# Patient Record
Sex: FEMALE | Race: WHITE | NOT HISPANIC OR LATINO | URBAN - METROPOLITAN AREA
[De-identification: names, ages, dates, MRNs, and addresses within clinical notes are randomized per-mention and may not be internally consistent; named-entity substitution may affect disease eponyms.]

---

## 2023-09-12 PROBLEM — F41.9 ANXIETY DISORDER: Status: ACTIVE | Noted: 2023-09-12

## 2023-09-12 PROBLEM — C50.919 INVASIVE DUCTAL CARCINOMA OF BREAST (MULTI): Status: ACTIVE | Noted: 2023-09-12

## 2023-09-12 RX ORDER — AMITRIPTYLINE HYDROCHLORIDE 10 MG/1
TABLET, FILM COATED ORAL
COMMUNITY
Start: 2023-01-14 | End: 2023-11-20 | Stop reason: WASHOUT

## 2023-09-12 RX ORDER — ACETAMINOPHEN 325 MG/1
1-2 TABLET ORAL DAILY PRN
COMMUNITY
Start: 2022-05-20

## 2023-09-12 RX ORDER — ATORVASTATIN CALCIUM 10 MG/1
TABLET, FILM COATED ORAL
COMMUNITY
Start: 2020-01-07 | End: 2023-11-20 | Stop reason: WASHOUT

## 2023-09-12 RX ORDER — FLUOXETINE HYDROCHLORIDE 20 MG/1
2 CAPSULE ORAL DAILY
COMMUNITY
Start: 2018-10-24 | End: 2023-11-20 | Stop reason: SDUPTHER

## 2023-09-12 RX ORDER — CHOLECALCIFEROL (VITAMIN D3) 125 MCG
1 CAPSULE ORAL DAILY
COMMUNITY
Start: 2020-10-01

## 2023-10-16 ENCOUNTER — APPOINTMENT (OUTPATIENT)
Dept: BEHAVIORAL HEALTH | Facility: CLINIC | Age: 58
End: 2023-10-16
Payer: COMMERCIAL

## 2023-10-16 ENCOUNTER — TELEPHONE (OUTPATIENT)
Dept: OTHER | Age: 58
End: 2023-10-16

## 2023-10-16 NOTE — TELEPHONE ENCOUNTER
Caller : pt, had appointment with you today, but provider cxl    Medication:  Fluoxetine 20 mg    Pharmacy:  [facility]    Next visit: 11/20

## 2023-11-13 PROBLEM — M25.641 FINGER STIFFNESS, RIGHT: Status: ACTIVE | Noted: 2019-01-30

## 2023-11-13 PROBLEM — K21.9 GERD (GASTROESOPHAGEAL REFLUX DISEASE): Status: ACTIVE | Noted: 2023-01-10

## 2023-11-13 PROBLEM — K64.4 EXTERNAL HEMORRHOIDS: Status: ACTIVE | Noted: 2018-11-05

## 2023-11-13 PROBLEM — F41.9 ANXIETY: Status: ACTIVE | Noted: 2023-01-10

## 2023-11-13 PROBLEM — R31.29 MICROSCOPIC HEMATURIA: Status: ACTIVE | Noted: 2018-01-09

## 2023-11-13 PROBLEM — R31.9 HEMATURIA: Status: ACTIVE | Noted: 2018-09-06

## 2023-11-13 PROBLEM — Z98.890 PONV (POSTOPERATIVE NAUSEA AND VOMITING): Status: ACTIVE | Noted: 2023-01-10

## 2023-11-13 PROBLEM — K62.5 RB (RECTAL BLEEDING): Status: ACTIVE | Noted: 2018-11-05

## 2023-11-13 PROBLEM — F52.0 HYPOACTIVE SEXUAL DESIRE DISORDER: Status: ACTIVE | Noted: 2023-11-13

## 2023-11-13 PROBLEM — E78.5 HYPERLIPIDEMIA: Status: ACTIVE | Noted: 2023-01-10

## 2023-11-13 PROBLEM — M17.12 PRIMARY OSTEOARTHRITIS OF LEFT KNEE: Status: ACTIVE | Noted: 2020-01-15

## 2023-11-13 PROBLEM — R23.4 BREAST SKIN CHANGES: Status: ACTIVE | Noted: 2022-02-11

## 2023-11-13 PROBLEM — N95.2 VAGINAL ATROPHY: Status: ACTIVE | Noted: 2020-10-01

## 2023-11-13 PROBLEM — C44.310 FACIAL BASAL CELL CANCER: Status: ACTIVE | Noted: 2023-11-13

## 2023-11-13 PROBLEM — S52.531A FRACTURE, COLLES, RIGHT, CLOSED: Status: ACTIVE | Noted: 2019-01-23

## 2023-11-13 PROBLEM — C44.329 SQUAMOUS CELL CANCER OF SKIN OF RIGHT CHEEK: Status: ACTIVE | Noted: 2023-01-10

## 2023-11-13 PROBLEM — C44.612 BASAL CELL CARCINOMA (BCC) OF RIGHT UPPER ARM: Status: ACTIVE | Noted: 2023-01-10

## 2023-11-13 PROBLEM — M25.631 WRIST STIFFNESS, RIGHT: Status: ACTIVE | Noted: 2019-01-30

## 2023-11-13 PROBLEM — C50.919 INFILTRATING DUCTAL CARCINOMA OF BREAST (MULTI): Status: ACTIVE | Noted: 2021-11-01

## 2023-11-13 PROBLEM — E83.52 HYPERCALCEMIA: Status: ACTIVE | Noted: 2023-11-13

## 2023-11-13 PROBLEM — N95.1 SYMPTOMATIC MENOPAUSAL OR FEMALE CLIMACTERIC STATES: Status: ACTIVE | Noted: 2020-10-01

## 2023-11-13 PROBLEM — M25.50 JOINT PAIN: Status: ACTIVE | Noted: 2023-11-13

## 2023-11-13 PROBLEM — R11.2 PONV (POSTOPERATIVE NAUSEA AND VOMITING): Status: ACTIVE | Noted: 2023-01-10

## 2023-11-13 PROBLEM — D25.9 FIBROID UTERUS: Status: ACTIVE | Noted: 2023-11-13

## 2023-11-13 PROBLEM — M81.0 OSTEOPOROSIS: Status: ACTIVE | Noted: 2019-01-01

## 2023-11-13 PROBLEM — E55.9 VITAMIN D DEFICIENCY: Status: ACTIVE | Noted: 2019-01-23

## 2023-11-13 RX ORDER — OMEPRAZOLE 20 MG/1
20 CAPSULE, DELAYED RELEASE ORAL
COMMUNITY
Start: 2022-10-26 | End: 2023-11-20 | Stop reason: WASHOUT

## 2023-11-13 RX ORDER — TRIAMCINOLONE ACETONIDE 1 MG/G
OINTMENT TOPICAL
COMMUNITY
Start: 2023-04-07 | End: 2023-11-20 | Stop reason: WASHOUT

## 2023-11-13 RX ORDER — NORETHINDRONE ACETATE AND ETHINYL ESTRADIOL, ETHINYL ESTRADIOL AND FERROUS FUMARATE 1MG-10(24)
KIT ORAL
COMMUNITY
End: 2023-11-20 | Stop reason: WASHOUT

## 2023-11-13 RX ORDER — ATORVASTATIN CALCIUM 20 MG/1
TABLET, FILM COATED ORAL
COMMUNITY
Start: 2023-10-11 | End: 2023-11-20 | Stop reason: WASHOUT

## 2023-11-13 RX ORDER — NITROFURANTOIN 25; 75 MG/1; MG/1
100 CAPSULE ORAL 2 TIMES DAILY
COMMUNITY
Start: 2023-08-17 | End: 2023-08-22

## 2023-11-13 RX ORDER — SULFAMETHOXAZOLE AND TRIMETHOPRIM 800; 160 MG/1; MG/1
1 TABLET ORAL 2 TIMES DAILY
COMMUNITY
Start: 2023-09-08 | End: 2023-09-11

## 2023-11-20 ENCOUNTER — TELEMEDICINE (OUTPATIENT)
Dept: BEHAVIORAL HEALTH | Facility: CLINIC | Age: 58
End: 2023-11-20
Payer: COMMERCIAL

## 2023-11-20 DIAGNOSIS — F41.9 ANXIETY: ICD-10-CM

## 2023-11-20 PROCEDURE — 99213 OFFICE O/P EST LOW 20 MIN: CPT | Performed by: CLINICAL NURSE SPECIALIST

## 2023-11-20 RX ORDER — FLUOXETINE HYDROCHLORIDE 20 MG/1
40 CAPSULE ORAL DAILY
Qty: 180 CAPSULE | Refills: 2 | Status: SHIPPED | OUTPATIENT
Start: 2023-11-20 | End: 2023-12-29 | Stop reason: SDUPTHER

## 2023-11-20 RX ORDER — ATORVASTATIN CALCIUM 40 MG/1
40 TABLET, FILM COATED ORAL DAILY
COMMUNITY
Start: 2023-11-02

## 2023-11-20 NOTE — PROGRESS NOTES
Outpatient Psychiatry      Subjective   Heidi Meyer, a 58 y.o. female,presents as an established patient appointment for medication management /outpatient psychiatry     Diagnosis:    Anxiety disorder unspecified type     Patient Active Problem List   Diagnosis    Anxiety disorder    Invasive ductal carcinoma of breast (CMS/HCC)    Basal cell carcinoma (BCC) of right upper arm    Facial basal cell cancer    Blepharochalasis    Breast skin changes    Chondromalacia of patella    Primary osteoarthritis of left knee    Diarrhea    External hemorrhoids    Fatigue    Fibroid uterus    Finger stiffness, right    Fracture, Colles, right, closed    GERD (gastroesophageal reflux disease)    Hematuria    Hypercalcemia    Hypoactive sexual desire disorder    Joint pain    Localized osteoarthrosis    Microscopic hematuria    Hyperlipidemia    Moderate mixed hyperlipidemia not requiring statin therapy    Osteoporosis    PMDD (premenstrual dysphoric disorder)    PONV (postoperative nausea and vomiting)    RB (rectal bleeding)    Sprain of foot    Squamous cell cancer of skin of right cheek    Symptomatic menopausal or female climacteric states    Uterus, adenomyosis    Vaginal atrophy    Vitamin D deficiency    Wrist stiffness, right    Anxiety    Infiltrating ductal carcinoma of breast (CMS/HCC)       Treatment Goals:  Specify outcomes written in observable, behavioral terms:   Anxiety: reducing physical symptoms of anxiety    Treatment Plan/Recommendations: to follow up in 3-4 months , can call  for treatment and scheduling concerns , to continue self care and wellness efforts   Follow-up plan for depression was discussed with patient.    Review with patient: Treatment plan reviewed with the patient.  Medication risks/benefit reviewed with the patient     HPI:  mood has been stable   no concerns with fluoxetine , says this is working well for her for anxiety and depression treatment   support provided , she is  able to identify stressors and is coping well with them  reconciled medication list in the [facility] emr   sees oncology for breast cancer history          Review of Systems     Depressive Symptoms: fatigue, but~not depressed or irritable,~no loss of interest,~no change in appetite,~no recent 6 pounds lb weight gain,~no recent lb weight loss,~no insomnia,~not feeling worthless or guilty,~normal concentration,~ability to make decisions,~no suicidal ideation,~no guns or weapons in household.   Manic Symptoms: mood is not irritable or elevated,~self esteem is not grandiose or increased,~no changes in need for sleep,~not more talkative than usual,~does not have flight of ideas or racing thoughts,~no distractibility,~no psychomotor agitation or increased goal-directed activity,~no excessive involvement in pleasurable activities.   Psychotic Symptoms: no hallucinations,~no delusions,~no disorganized speech,~does not have disorganized behavior or catatonia,~no negative symptoms.   Anxiety Symptoms: no panic attacks,~no concerns about future panic attacks,~no worry about panic attack consequences,~no change in behavior due to panic attacks,~no excessive worry,~no difficulty controlling worry,~no increase in arousal,~not easily fatigued due to worry,~no difficulty concentrating due to worry,~no irritability due to worry,~no muscle tension due to worry,~no sleep disturbances due to worry,~no specific phobia,~no social phobia,~no obsessions,~no compulsions,~no exposure to traumatic event,~no re-experiencing of traumatic event,~no avoidance of stimuli and number of responsiveness,~no restlessness / feeling on edge due to worry.   Delirium/ Altered Mental Status Symptoms: no disturbances of consciousness,~no diminished ability to focus, sustain, shift attention,~no change in cognition or perceptual disturbances,~symptoms do not fluctuate during the course of the day,~general medical condition is not present.   Disordered Eating  Symptoms: weight is not less than 85% of ideal body weight,~no intense fear of gaining weight,~does not have a poor body image,~no restricting of diet and/or excessive exercise,~no purging or laxative use.   Post-traumatic stress disorder symptoms The patient is currently asymptomatic.   Inattentive Symptoms: does not make careless mistakes often,~does not have difficulty paying attention,~not often disorganized,~does not lose things often,~is not easily distracted,~is not often forgetful,~does not avoid/dislike tasks with sustained mental effort,~listens when spoken to directly,~is able to follow instructions and finish schoolwork.   Conduct Issues: no aggression towards people or animals,~no destruction of property,~no deceitfulness,~does not violate rules.   Other Symptoms/ Concerns: no symptoms of separation anxiety,~no reactive attachment symptoms,~no motor tics,~no vocal tics,~no stuttering,~no phonological problems,~no loss of urine control,~no encopresis,~no intellectual disability,~no self-injurious behaviors,~not somatic and no conversion symptoms,~no gender identity symptoms,~no sleep disorder symptoms,~no impulse control symptoms,~no personality disorder symptoms.       Constitutional: no sleep apnea,~normal sleeping,~no night wakings,~no snoring,~not a picky eater,~normal appetite,~no swallowing problems,~no night terrors,~no nightmares,~no restless sleep,~no snorts/gasps~and~no obesity.   Eyes: no vision test,~no vision impairment,~does not wear glasses/contacts,~does not wear glassess/contacts~and~no blindness.   ENT: no hearing tested,~no hearing loss,~no hearing aid,~no cochlear implant,~no excessive drooling,~no dental problems~and~no recurrent strep throat.   Cardiovascular: no murmur,~no heart defect,~no chest pain,~no palpitations~and~no syncope.   Respiratory: no wheezing~and~no asthma/reactive airway disease.   Gastrointestinal: no constipation,~no abdominal pain,~no nausea,~no vomiting,~no  diarrhea,~no blood in stools,~no g-tube~and~no reflux.   Genitourinary: no nocturnal enuresis,~no diurnal enuresis~and~no incontinence.   Musculoskeletal: normal gait~and~no torticollis, but~moving all extremities well and symmetrical,~no arthritis or joint problems,~no myalgias,~no muscle weakness~and~normal hand preference.   Integumentary: no changes in moles or birthmarks,~no rashes~and~no atopic dermatitis.   Neurological: no symmetrical facies,~no headache,~no head injury,~no seizures,~no staring spells,~no loss of consciousness,~no meningitis/encephalitis,~no cerebral palsy,~no spina bifida,~no stereotypy,~no developmental regression~and~no tics or twitches.   Endocrine: no temperature intolerance,~,~good growth~and~no failure to thrive.   Hematologic/Lymphatic: no anemia~and~no lead poisoning.        Current Medications:    Current Outpatient Medications:     atorvastatin (Lipitor) 20 mg tablet, , Disp: , Rfl:     omeprazole (PriLOSEC) 20 mg DR capsule, Take 1 capsule (20 mg) by mouth once daily., Disp: , Rfl:     triamcinolone (Kenalog) 0.1 % ointment, Apply to red irritated areas, twice a day as needed, Disp: , Rfl:     acetaminophen (TylenoL) 325 mg tablet, Take 1-2 tablets (325-650 mg) by mouth once daily as needed., Disp: , Rfl:     amitriptyline (Elavil) 10 mg tablet, , Disp: , Rfl:     amitriptyline HCl (AMITRIPTYLINE ORAL), , Disp: , Rfl:     atorvastatin (Lipitor) 10 mg tablet, Take by mouth., Disp: , Rfl:     cholecalciferol (Vitamin D-3) 125 MCG (5000 UT) capsule, Take 1 capsule (125 mcg) by mouth once daily., Disp: , Rfl:     FLUoxetine (PROzac) 20 mg capsule, Take 2 capsules (40 mg) by mouth once daily., Disp: , Rfl:     norethindrone-e.estradioL-iron (Lo Loestrin Fe) 1 mg-10 mcg (24)/10 mcg (2) tablet, , Disp: , Rfl:   Medical History:  No past medical history on file.  Surgical History:  No past surgical history on file.  Family History:  No family history on file.  Social History:  Social  History     Socioeconomic History    Marital status:      Spouse name: Not on file    Number of children: Not on file    Years of education: Not on file    Highest education level: Not on file   Occupational History    Not on file   Tobacco Use    Smoking status: Not on file    Smokeless tobacco: Not on file   Substance and Sexual Activity    Alcohol use: Not on file    Drug use: Not on file    Sexual activity: Not on file   Other Topics Concern    Not on file   Social History Narrative    Not on file     Social Determinants of Health     Financial Resource Strain: Not on file   Food Insecurity: Not on file   Transportation Needs: Not on file   Physical Activity: Not on file   Stress: Not on file   Social Connections: Not on file   Intimate Partner Violence: Not on file   Housing Stability: Not on file     Record Review: brief     Vitals:  There were no vitals filed for this visit.    Johana Woods, APRN-CNS

## 2023-12-29 ENCOUNTER — TELEMEDICINE (OUTPATIENT)
Dept: BEHAVIORAL HEALTH | Facility: CLINIC | Age: 58
End: 2023-12-29
Payer: COMMERCIAL

## 2023-12-29 DIAGNOSIS — F41.9 ANXIETY: ICD-10-CM

## 2023-12-29 PROCEDURE — 99215 OFFICE O/P EST HI 40 MIN: CPT | Performed by: CLINICAL NURSE SPECIALIST

## 2023-12-29 RX ORDER — FLUOXETINE HYDROCHLORIDE 20 MG/1
40 CAPSULE ORAL DAILY
Qty: 180 CAPSULE | Refills: 2 | Status: SHIPPED | OUTPATIENT
Start: 2023-12-29 | End: 2024-03-28
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
## 2024-09-03 ENCOUNTER — TELEPHONE (OUTPATIENT)
Dept: BEHAVIORAL HEALTH | Facility: CLINIC | Age: 59
End: 2024-09-03
Payer: COMMERCIAL

## 2024-09-03 DIAGNOSIS — F41.9 ANXIETY: ICD-10-CM

## 2024-09-03 RX ORDER — FLUOXETINE HYDROCHLORIDE 20 MG/1
40 CAPSULE ORAL DAILY
Qty: 180 CAPSULE | Refills: 2 | Status: CANCELLED | OUTPATIENT
Start: 2024-09-03 | End: 2024-12-02

## 2024-09-04 ENCOUNTER — DOCUMENTATION (OUTPATIENT)
Dept: BEHAVIORAL HEALTH | Facility: CLINIC | Age: 59
End: 2024-09-04
Payer: COMMERCIAL

## 2024-09-04 DIAGNOSIS — F41.9 ANXIETY: ICD-10-CM

## 2024-09-04 DIAGNOSIS — F41.1 GAD (GENERALIZED ANXIETY DISORDER): ICD-10-CM

## 2024-09-04 RX ORDER — FLUOXETINE HYDROCHLORIDE 20 MG/1
40 CAPSULE ORAL DAILY
Qty: 180 CAPSULE | Refills: 0 | Status: SHIPPED | OUTPATIENT
Start: 2024-09-04 | End: 2024-12-03

## 2024-09-04 NOTE — PROGRESS NOTES
Non billable note : sent refill per request to pharmacy after review of [facility] emr med order history for fluoxetine kpacer cns

## 2024-10-11 ENCOUNTER — APPOINTMENT (OUTPATIENT)
Dept: BEHAVIORAL HEALTH | Facility: CLINIC | Age: 59
End: 2024-10-11
Payer: COMMERCIAL

## 2024-10-11 DIAGNOSIS — Z91.49 PSYCHOLOGICAL TRAUMA HISTORY: ICD-10-CM

## 2024-10-11 DIAGNOSIS — F41.9 ANXIETY: ICD-10-CM

## 2024-10-11 PROCEDURE — 99213 OFFICE O/P EST LOW 20 MIN: CPT | Performed by: CLINICAL NURSE SPECIALIST

## 2024-10-11 RX ORDER — FLUOXETINE HYDROCHLORIDE 20 MG/1
40 CAPSULE ORAL DAILY
Qty: 180 CAPSULE | Refills: 2 | Status: SHIPPED | OUTPATIENT
Start: 2024-10-11 | End: 2025-01-09

## 2024-10-11 NOTE — PROGRESS NOTES
Outpatient Psychiatry      Subjective   Heidi Meyer, a 59 y.o. female, presents as an established patient appointment for medication management /outpatient psychiatry virtual audio and video appointment   Virtual or Telephone Consent    An interactive audio and video telecommunication system which permits real time communications between the patient (at the originating site) and provider (at the distant site) was utilized to provide this telehealth service.   Verbal consent was requested and obtained from Heidi Meyer on this date, 10/20/24 for a telehealth visit.       Diagnosis: Anxiety disorder unspecified type F41.9 stable   Trauma history Z91.49      Problem List       Patient Active Problem List   Diagnosis    Anxiety disorder    Invasive ductal carcinoma of breast (CMS/HCC)    Basal cell carcinoma (BCC) of right upper arm    Facial basal cell cancer    Blepharochalasis    Breast skin changes    Chondromalacia of patella    Primary osteoarthritis of left knee    Diarrhea    External hemorrhoids    Fatigue    Fibroid uterus    Finger stiffness, right    Fracture, Colles, right, closed    GERD (gastroesophageal reflux disease)    Hematuria    Hypercalcemia    Hypoactive sexual desire disorder    Joint pain    Localized osteoarthrosis    Microscopic hematuria    Hyperlipidemia    Moderate mixed hyperlipidemia not requiring statin therapy    Osteoporosis    PMDD (premenstrual dysphoric disorder)    PONV (postoperative nausea and vomiting)    RB (rectal bleeding)    Sprain of foot    Squamous cell cancer of skin of right cheek    Symptomatic menopausal or female climacteric states    Uterus, adenomyosis    Vaginal atrophy    Vitamin D deficiency    Wrist stiffness, right    Anxiety    Infiltrating ductal carcinoma of breast (CMS/HCC)         Treatment Goals:  Specify outcomes written in observable, behavioral terms:   Anxiety: reducing physical symptoms of anxiety      Treatment Plan/Recommendations: to  follow up in 3-4 months , can call  for treatment and scheduling concerns , to continue self care and wellness efforts    Follow-up plan was discussed with patient.  Psychotropic medication :  Continue fluoxetine 20 mg , 2 capsules daily for anxiety      Review with patient: Treatment plan reviewed with the patient.  Medication risks/benefit reviewed with the patient     HPI:   no concerns with fluoxetine , says this is working well for her for anxiety and depression treatment   support provided , she is able to identify stressors and is coping well with them, support provided , insight is good   She says she has been more tired lately    reconciled medication list in the [facility] emr       sees oncology for breast cancer history   Is having some ongoing medical care per gyn provider   reviewed notes in the [facility] emr from other medical providers       psych ros and medical ros as noted above     Patient Active Problem List   Diagnosis    Anxiety disorder    Invasive ductal carcinoma of breast (Multi)    Basal cell carcinoma (BCC) of right upper arm    Facial basal cell cancer    Blepharochalasis    Breast skin changes    Chondromalacia of patella    Primary osteoarthritis of left knee    Diarrhea    External hemorrhoids    Fatigue    Fibroid uterus    Finger stiffness, right    Fracture, Colles, right, closed    GERD (gastroesophageal reflux disease)    Hematuria    Hypercalcemia    Hypoactive sexual desire disorder    Joint pain    Localized osteoarthrosis    Microscopic hematuria    Hyperlipidemia    Moderate mixed hyperlipidemia not requiring statin therapy    Osteoporosis    PMDD (premenstrual dysphoric disorder)    PONV (postoperative nausea and vomiting)    RB (rectal bleeding)    Sprain of foot    Squamous cell cancer of skin of right cheek    Symptomatic menopausal or female climacteric states    Uterus, adenomyosis    Vaginal atrophy    Vitamin D deficiency    Wrist stiffness, right    Anxiety     Infiltrating ductal carcinoma of breast (Multi)     Current Outpatient Medications:     acetaminophen (TylenoL) 325 mg tablet, Take 1-2 tablets (325-650 mg) by mouth once daily as needed., Disp: , Rfl:     atorvastatin (Lipitor) 40 mg tablet, Take 1 tablet (40 mg) by mouth once daily., Disp: , Rfl:     cholecalciferol (Vitamin D-3) 125 MCG (5000 UT) capsule, Take 1 capsule (125 mcg) by mouth once daily., Disp: , Rfl:     FLUoxetine (PROzac) 20 mg capsule, Take 2 capsules (40 mg) by mouth once daily., Disp: 180 capsule, Rfl: 0  Medical History:  No past medical history on file.  Surgical History:  No past surgical history on file.  Family History:  No family history on file.  Social History:  Social History     Socioeconomic History    Marital status:      Spouse name: Not on file    Number of children: Not on file    Years of education: Not on file    Highest education level: Not on file   Occupational History    Not on file   Tobacco Use    Smoking status: Not on file    Smokeless tobacco: Not on file   Substance and Sexual Activity    Alcohol use: Not on file    Drug use: Not on file    Sexual activity: Not on file   Other Topics Concern    Not on file   Social History Narrative    Not on file     Social Determinants of Health     Financial Resource Strain: Low Risk  (10/1/2023)    Received from [facility]    Overall Financial Resource Strain (CARDIA)     Difficulty of Paying Living Expenses: Not hard at all   Food Insecurity: No Food Insecurity (10/1/2023)    Received from [facility]    Hunger Vital Sign     Worried About Running Out of Food in the Last Year: Never true     Ran Out of Food in the Last Year: Never true   Transportation Needs: No Transportation Needs (10/1/2023)    Received from [facility]    PRAPARE - Transportation     Lack of Transportation (Medical): No     Lack of Transportation (Non-Medical): No   Physical  Activity: Sufficiently Active (10/1/2023)    Received from [facility]    Exercise Vital Sign     Days of Exercise per Week: 4 days     Minutes of Exercise per Session: 40 min   Stress: No Stress Concern Present (10/1/2023)    Received from [facility]    [employer] [place] [employer] - Occupational Stress Questionnaire     Feeling of Stress : Not at all   Social Connections: Socially Integrated (10/1/2023)    Received from [facility]    Social Connection and Isolation Panel [NHANES]     Frequency of Communication with Friends and Family: More than three times a week     Frequency of Social Gatherings with Friends and Family: Three times a week     Attends Mormonism Services: 1 to 4 times per year     Active Member of Clubs or Organizations: Yes     Attends Club or Organization Meetings: 1 to 4 times per year     Marital Status:    Intimate Partner Violence: Not on file   Housing Stability: Low Risk  (10/1/2023)    Received from [facility]    Housing Stability Vital Sign     Unable to Pay for Housing in the Last Year: No     Number of Places Lived in the Last Year: 1     Unstable Housing in the Last Year: No     Vitals:  There were no vitals filed for this visit.    Johana Woods, APRN-CNS

## 2024-12-05 ENCOUNTER — DOCUMENTATION (OUTPATIENT)
Dept: BEHAVIORAL HEALTH | Facility: CLINIC | Age: 59
End: 2024-12-05
Payer: COMMERCIAL

## 2024-12-05 DIAGNOSIS — F41.9 ANXIETY: ICD-10-CM

## 2024-12-05 RX ORDER — FLUOXETINE HYDROCHLORIDE 40 MG/1
40 CAPSULE ORAL DAILY
Qty: 90 CAPSULE | Refills: 1 | Status: SHIPPED | OUTPATIENT
Start: 2024-12-05 | End: 2025-03-05

## 2024-12-05 RX ORDER — FLUOXETINE HYDROCHLORIDE 20 MG/1
20 CAPSULE ORAL DAILY
Qty: 90 CAPSULE | Refills: 1 | Status: SHIPPED | OUTPATIENT
Start: 2024-12-05 | End: 2025-03-05

## 2024-12-05 NOTE — PROGRESS NOTES
Nonbillable note : patient sent message about desire to increase dose for increased anxiety , communicated with patient and sent increased dose to pharmacy after review of [facility] emr med order history kpacer cns

## 2025-03-07 ENCOUNTER — APPOINTMENT (OUTPATIENT)
Dept: BEHAVIORAL HEALTH | Facility: CLINIC | Age: 60
End: 2025-03-07
Payer: COMMERCIAL

## 2025-03-12 ENCOUNTER — APPOINTMENT (OUTPATIENT)
Dept: BEHAVIORAL HEALTH | Facility: CLINIC | Age: 60
End: 2025-03-12
Payer: COMMERCIAL

## 2025-04-13 ENCOUNTER — DOCUMENTATION (OUTPATIENT)
Dept: BEHAVIORAL HEALTH | Facility: CLINIC | Age: 60
End: 2025-04-13
Payer: COMMERCIAL

## 2025-04-13 DIAGNOSIS — F41.9 ANXIETY DISORDER, UNSPECIFIED TYPE: ICD-10-CM

## 2025-04-13 DIAGNOSIS — F41.9 ANXIETY: ICD-10-CM

## 2025-04-13 RX ORDER — FLUOXETINE HYDROCHLORIDE 40 MG/1
40 CAPSULE ORAL DAILY
Qty: 90 CAPSULE | Refills: 0 | Status: SHIPPED | OUTPATIENT
Start: 2025-04-13 | End: 2025-04-14 | Stop reason: SDUPTHER

## 2025-04-13 RX ORDER — FLUOXETINE HYDROCHLORIDE 20 MG/1
20 CAPSULE ORAL DAILY
Qty: 90 CAPSULE | Refills: 0 | Status: SHIPPED | OUTPATIENT
Start: 2025-04-13 | End: 2025-04-14 | Stop reason: SDUPTHER

## 2025-04-13 NOTE — PROGRESS NOTES
Nonbillable note : patient left message about need for fluoxetine script refills . Reviewedmost recent appointment note in the [facility] emr , reviewed med history in the [facility] em[facility] and sent scripts to pharmacy [facility]

## 2025-04-14 ENCOUNTER — DOCUMENTATION (OUTPATIENT)
Dept: BEHAVIORAL HEALTH | Facility: CLINIC | Age: 60
End: 2025-04-14
Payer: COMMERCIAL

## 2025-04-14 DIAGNOSIS — F41.9 ANXIETY: ICD-10-CM

## 2025-04-14 RX ORDER — FLUOXETINE HYDROCHLORIDE 20 MG/1
20 CAPSULE ORAL DAILY
Qty: 90 CAPSULE | Refills: 0 | Status: SHIPPED | OUTPATIENT
Start: 2025-04-14 | End: 2025-07-13

## 2025-04-14 RX ORDER — FLUOXETINE HYDROCHLORIDE 40 MG/1
40 CAPSULE ORAL DAILY
Qty: 90 CAPSULE | Refills: 0 | Status: SHIPPED | OUTPATIENT
Start: 2025-04-14 | End: 2025-07-13

## 2025-04-29 ENCOUNTER — APPOINTMENT (OUTPATIENT)
Dept: BEHAVIORAL HEALTH | Facility: CLINIC | Age: 60
End: 2025-04-29
Payer: COMMERCIAL

## 2025-04-29 DIAGNOSIS — F41.9 ANXIETY: ICD-10-CM

## 2025-04-29 PROCEDURE — 99212 OFFICE O/P EST SF 10 MIN: CPT | Performed by: CLINICAL NURSE SPECIALIST

## 2025-04-29 RX ORDER — ATORVASTATIN CALCIUM 80 MG/1
80 TABLET, FILM COATED ORAL DAILY
COMMUNITY
Start: 2025-04-20

## 2025-04-29 RX ORDER — FLUOXETINE HYDROCHLORIDE 40 MG/1
40 CAPSULE ORAL DAILY
Qty: 90 CAPSULE | Refills: 1 | Status: SHIPPED | OUTPATIENT
Start: 2025-04-29 | End: 2025-07-28

## 2025-04-29 RX ORDER — FLUOXETINE 10 MG/1
10 CAPSULE ORAL DAILY
Qty: 90 CAPSULE | Refills: 1 | Status: SHIPPED | OUTPATIENT
Start: 2025-04-29 | End: 2025-07-28

## 2025-04-29 NOTE — PROGRESS NOTES
Outpatient Psychiatry      Subjective   Heidi Meyer, a 59 y.o. female, presents as an established patient appointment for medication management /outpatient psychiatry virtual audio and video appointment   Virtual or Telephone Consent     An interactive audio and video telecommunication system which permits real time communications between the patient (at the originating site) and provider (at the distant site) was utilized to provide this telehealth service.   Verbal consent was requested and obtained from Heidi Meyer on this date, 4/29/25 for a telehealth visit.       Diagnosis: Anxiety disorder unspecified type F41.9 stable   Trauma history Z91.49      Problem List         Patient Active Problem List   Diagnosis    Anxiety disorder    Invasive ductal carcinoma of breast (CMS/HCC)    Basal cell carcinoma (BCC) of right upper arm    Facial basal cell cancer    Blepharochalasis    Breast skin changes    Chondromalacia of patella    Primary osteoarthritis of left knee    Diarrhea    External hemorrhoids    Fatigue    Fibroid uterus    Finger stiffness, right    Fracture, Colles, right, closed    GERD (gastroesophageal reflux disease)    Hematuria    Hypercalcemia    Hypoactive sexual desire disorder    Joint pain    Localized osteoarthrosis    Microscopic hematuria    Hyperlipidemia    Moderate mixed hyperlipidemia not requiring statin therapy    Osteoporosis    PMDD (premenstrual dysphoric disorder)    PONV (postoperative nausea and vomiting)    RB (rectal bleeding)    Sprain of foot    Squamous cell cancer of skin of right cheek    Symptomatic menopausal or female climacteric states    Uterus, adenomyosis    Vaginal atrophy    Vitamin D deficiency    Wrist stiffness, right    Anxiety    Infiltrating ductal carcinoma of breast (CMS/HCC)         Treatment Goals:  Specify outcomes written in observable, behavioral terms:   Anxiety: reducing physical symptoms of anxiety      Treatment Plan/Recommendations:  to follow up in 3-4 months , can call  for treatment and scheduling concerns , to continue self care and wellness efforts    Follow-up plan was discussed with patient.  Psychotropic medication :  Continue fluoxetine 40 mg , 1 capsule daily for anxiety and 10 mg daily ( total dose is 50 mg , daily )      Review with patient: Treatment plan reviewed with the patient.  Medication risks/benefit reviewed with the patient     HPI:   no concerns with fluoxetine , says this is working well for her for anxiety and depression treatment   support provided , she is able to identify stressors and is coping well with them, insight is good   She says she has been more tired lately    reconciled medication list in the [facility] emr       sees oncology for breast cancer history   Is having some ongoing medical care per gyn provider   reviewed notes in the [facility] emr from other medical providers       psych ros and medical ros as noted above     Vitals:  There were no vitals filed for this visit.    Johana Woods, STANLEY-CNS

## 2025-07-05 ENCOUNTER — DOCUMENTATION (OUTPATIENT)
Dept: BEHAVIORAL HEALTH | Facility: CLINIC | Age: 60
End: 2025-07-05
Payer: COMMERCIAL

## 2025-07-05 DIAGNOSIS — F41.9 ANXIETY: ICD-10-CM

## 2025-07-05 RX ORDER — FLUOXETINE HYDROCHLORIDE 40 MG/1
40 CAPSULE ORAL DAILY
Qty: 90 CAPSULE | Refills: 0 | Status: SHIPPED | OUTPATIENT
Start: 2025-07-05 | End: 2025-10-03

## 2025-07-05 RX ORDER — FLUOXETINE 10 MG/1
10 CAPSULE ORAL DAILY
Qty: 90 CAPSULE | Refills: 0 | Status: SHIPPED | OUTPATIENT
Start: 2025-07-05 | End: 2025-10-03

## 2025-07-05 NOTE — PROGRESS NOTES
Nonbillable note : patient communicated that she needs refills on fluoxetine . Sent order to pharmacy though she should have another refill on file . Reviewed med order history in [facility] em[facility] and communicated with patient , geovani cns

## 2025-07-31 ENCOUNTER — APPOINTMENT (OUTPATIENT)
Dept: BEHAVIORAL HEALTH | Facility: CLINIC | Age: 60
End: 2025-07-31
Payer: COMMERCIAL

## 2025-07-31 DIAGNOSIS — Z91.49 PSYCHOLOGICAL TRAUMA HISTORY: ICD-10-CM

## 2025-07-31 DIAGNOSIS — F41.9 ANXIETY DISORDER, UNSPECIFIED TYPE: Primary | ICD-10-CM

## 2025-07-31 PROCEDURE — 99214 OFFICE O/P EST MOD 30 MIN: CPT | Performed by: CLINICAL NURSE SPECIALIST

## 2025-07-31 NOTE — PROGRESS NOTES
Outpatient Psychiatry      Subjective   Heidi Meyer, a 60 y.o. female, presents as an established patient appointment for medication management /outpatient psychiatry virtual audio and video appointment   Virtual or Telephone Consent     An interactive audio and video telecommunication system which permits real time communications between the patient (at the originating site) and provider (at the distant site) was utilized to provide this telehealth service.   Verbal consent was requested and obtained from Heidi Meyer on this date, 7/31/25 for a telehealth visit.       Diagnosis: Anxiety disorder unspecified type F41.9 stable primary diagnoses   Trauma history Z91.49      Problem List         Patient Active Problem List   Diagnosis    Anxiety disorder    Invasive ductal carcinoma of breast (CMS/HCC)    Basal cell carcinoma (BCC) of right upper arm    Facial basal cell cancer    Blepharochalasis    Breast skin changes    Chondromalacia of patella    Primary osteoarthritis of left knee    Diarrhea    External hemorrhoids    Fatigue    Fibroid uterus    Finger stiffness, right    Fracture, Colles, right, closed    GERD (gastroesophageal reflux disease)    Hematuria    Hypercalcemia    Hypoactive sexual desire disorder    Joint pain    Localized osteoarthrosis    Microscopic hematuria    Hyperlipidemia    Moderate mixed hyperlipidemia not requiring statin therapy    Osteoporosis    PMDD (premenstrual dysphoric disorder)    PONV (postoperative nausea and vomiting)    RB (rectal bleeding)    Sprain of foot    Squamous cell cancer of skin of right cheek    Symptomatic menopausal or female climacteric states    Uterus, adenomyosis    Vaginal atrophy    Vitamin D deficiency    Wrist stiffness, right    Anxiety    Infiltrating ductal carcinoma of breast (CMS/HCC)         Treatment Goals:  Specify outcomes written in observable, behavioral terms:   Anxiety: reducing physical symptoms of anxiety      Treatment  Plan/Recommendations: to follow up in 3-4 months , can call  for treatment and scheduling concerns , to continue self care and wellness efforts    Follow-up plan was discussed with patient.  Psychotropic medication :  Continue fluoxetine 40 mg , 1 capsule daily for anxiety and 10 mg daily  (total dose is 50 mg , daily)      Review with patient: Treatment plan reviewed with the patient.  Medication risks/benefit reviewed with the patient     HPI:   no concerns with fluoxetine , says this is working well for her for anxiety  , the dose is working well for her   No periods of depressed moods   support provided , she is able to identify stressors and is coping well with them, insight is good     reconciled medication list in the [facility] emr       sees oncology for breast cancer history    reviewed notes in the [facility] emr from other medical providers   Work is going well      psych ros and medical ros as noted above     Social History     Socioeconomic History    Marital status:      Spouse name: Not on file    Number of children: Not on file    Years of education: Not on file    Highest education level: Not on file   Occupational History    Not on file   Tobacco Use    Smoking status: Not on file    Smokeless tobacco: Not on file   Substance and Sexual Activity    Alcohol use: Not on file    Drug use: Not on file    Sexual activity: Not on file   Other Topics Concern    Not on file   Social History Narrative    Not on file     Social Drivers of Health     Financial Resource Strain: Low Risk  (4/28/2025)    Received from [facility]    Overall Financial Resource Strain (CARDIA)     Difficulty of Paying Living Expenses: Not hard at all   Food Insecurity: No Food Insecurity (4/28/2025)    Received from [facility]    Hunger Vital Sign     Within the past 12 months, you worried that your food would run out before you got the money to buy more.: Never true     Within the past 12 months, the food you  bought just didn't last and you didn't have money to get more.: Never true   Transportation Needs: No Transportation Needs (4/28/2025)    Received from [facility]    PRAPARE - Transportation     Lack of Transportation (Medical): No     Lack of Transportation (Non-Medical): No   Physical Activity: Insufficiently Active (4/28/2025)    Received from [facility]    Exercise Vital Sign     On average, how many days per week do you engage in moderate to strenuous exercise (like a brisk walk)?: 4 days     On average, how many minutes do you engage in exercise at this level?: 30 min   Stress: No Stress Concern Present (4/28/2025)    Received from [facility]    [employer] [place] [employer] - Occupational Stress Questionnaire     Feeling of Stress : Only a little   Social Connections: Socially Integrated (4/28/2025)    Received from [facility]    Social Connection and Isolation Panel     In a typical week, how many times do you talk on the phone with family, friends, or neighbors?: More than three times a week     How often do you get together with friends or relatives?: Three times a week     How often do you attend Scientologist or Orthodoxy services?: 1 to 4 times per year     Do you belong to any clubs or organizations such as Scientologist groups, unions, fraternal or athletic groups, or school groups?: Yes     How often do you attend meetings of the clubs or organizations you belong to?: 1 to 4 times per year     Are you , , , , never , or living with a partner?:    Intimate Partner Violence: Not on file   Housing Stability: Low Risk  (10/1/2023)    Received from [facility]    Housing Stability Vital Sign     Unable to Pay for Housing in the Last Year: No     Number of Places Lived in the Last Year: 1     Unstable Housing in the Last Year: No     Vitals:  There were no vitals filed for this visit.    Johana Woods, APRN-CNS

## 2025-08-09 PROBLEM — Z91.49 PSYCHOLOGICAL TRAUMA HISTORY: Status: ACTIVE | Noted: 2025-08-09

## 2025-09-25 ENCOUNTER — APPOINTMENT (OUTPATIENT)
Dept: BEHAVIORAL HEALTH | Facility: CLINIC | Age: 60
End: 2025-09-25
Payer: COMMERCIAL